# Patient Record
(demographics unavailable — no encounter records)

---

## 2025-04-09 NOTE — ASSESSMENT
[FreeTextEntry1] : 1. History of uncontrolled hypothyroidism. Appears euthyroid - Proper intake of levothyroxine is reviewed in details, including on an empty stomach, with water only, at least one hour before taking any medications, vitamins, or supplements and three-four hours before taking iron or calcium. - neg screen for celiac, HAMA antibodies - continue present dose of Synthroid 300 mcg. Monitor tft's trend- might need to lower the dose next visit  2. Hypogonadism. I've again advised an evaluation for sleep apnea. will refer. off clomid, feels better on TRT- advised on R+B, physiological intake cont  Testo cypionate 100 mg IM qw and monitor - options for ED reviewed including PDE inhibitors.can use cialis 5 mg - advised on phlebotomies   RTC 3-4 months

## 2025-04-09 NOTE — HISTORY OF PRESENT ILLNESS
[FreeTextEntry1] : 45 year male  f/u for hypothyroidism and hypogonadism management.   *** Apr 09, 2025 ***  Last visit more than a year ago. missed f/u appts b/o covid and flu under stress, going through divorce- has 3 kids Staying on Synthroid 300 mcg daily, ASA 81 mg, testosterone cypionate 100 mg qw feels well, much stronger, good energy. good libido, no ED. using Cialis 5 mg qd has not been doing blood donations recently Labs from 12/29/2024 reviewed-TSH-1.7, testosterone-347, free testosterone-6.6, PSA-0.46, H/H- 16/50, AST/ALT- 50/56 stopped pre-work out supplements  *** Feb 05, 2024 ***  stopped clomid. was not able to get testo from his pharmacy. [facility]- was on testo cypionate 50 mg TIW feels much better overall, libido improved. good erections incl am erections last labs (2 days post testo injection0- free T- 9.8, total T- 630 LFT's elevated again- associates with going back to gym on Synthroid 300 mcg- TSH- 0.74  *** Nov 02, 2023 ***  feels great since resuming clomid (taking 25 mg qd). excellent energy, libido. no ED remains on Synthroid 300 mcg labs reviewed as below ( took clomid the night before; blood work done early in am fasting) stopped going to gym, but kept all his supplements- lft's improved. thinks it was related to an increased exercise activity back to gym, but does it more carefully  Abd US (7/27/23)- normal  *** Mar 02, 2023 ***  doing much better, energy drastically improved. sleep is better did not proceed with sleep apnea eval on synthroid 300 mcg  *** Dec 01, 2022 ***  feels much better on Synthroid 300 mcg, reports proper intake Great energy level throughout the day still with low libido and poor erections. did not proceed with SANTOS eval yet last TSH- 1.28   *** Sep 01, 2022 ***  staying on a brand Synthroid 300 mcg. very tired, poor libido, erections are decreased. no am erections neg screen for celiac, HAMA antibodies so far  last testo- 132, TSH- 9.32 normal male karyotype 46,XY  HPI: Mr. LAMAS  was diagnosed with hypothyroidism more than 20 years ago. He was not medications for many years ("was not really interested in treating"). About 9 years ago he and his wife were undergoing fertility evaluation and he was found to be very hypothyroid and hypogonadal. He was started on L-thyroxine and went on Clomid treatment which raised his testosterone levels. He was able to have 3 children. He was born with 1 testicle, was seeing a urologist for some time.  Previously under care of Dr. Barr.  Most recently on 200+25 mcg of a generic L-thyroxine (the dose was increased about 5 months ago). He reports a proper daily intake. As far as he remembers , he's been always having issues with "fluctuating thyroid levels".  He feels somewhat tired, tries to work out  regularly, weight is overall stable. He's presently not on any TRT. His libido is low, and he has problems with erections. Spontaneous morning erections are rare. Shaving is preserved. + snoring, but never evaluated for SANTOS. He's been  using Viagra 50 mg with some success.  He  denies family history of thyroid cancer or history of radiation exposure to head and neck area in a childhood. He recalls having a normal thyroid sonogram in the past